# Patient Record
Sex: FEMALE | Race: OTHER
[De-identification: names, ages, dates, MRNs, and addresses within clinical notes are randomized per-mention and may not be internally consistent; named-entity substitution may affect disease eponyms.]

---
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
## 2020-05-06 ENCOUNTER — HOSPITAL ENCOUNTER (EMERGENCY)
Dept: HOSPITAL 62 - ER | Age: 20
Discharge: STILL ADMITTED OR ADMINISTRATIVE | End: 2020-05-06
Payer: MEDICAID

## 2020-05-06 ENCOUNTER — HOSPITAL ENCOUNTER (OUTPATIENT)
Dept: HOSPITAL 62 - LC | Age: 20
LOS: 1 days | Discharge: HOME | End: 2020-05-07
Attending: OBSTETRICS & GYNECOLOGY
Payer: MEDICAID

## 2020-05-06 VITALS — SYSTOLIC BLOOD PRESSURE: 124 MMHG | DIASTOLIC BLOOD PRESSURE: 72 MMHG

## 2020-05-06 DIAGNOSIS — O22.43: Primary | ICD-10-CM

## 2020-05-06 DIAGNOSIS — Z3A.34: ICD-10-CM

## 2020-05-06 DIAGNOSIS — R10.9: ICD-10-CM

## 2020-05-06 DIAGNOSIS — Z3A.35: ICD-10-CM

## 2020-05-06 DIAGNOSIS — O47.03: Primary | ICD-10-CM

## 2020-05-06 LAB
ADD MANUAL DIFF: NO
ALBUMIN SERPL-MCNC: 3.3 G/DL (ref 3.5–5)
ALP SERPL-CCNC: 198 U/L (ref 38–126)
ANION GAP SERPL CALC-SCNC: 9 MMOL/L (ref 5–19)
APPEARANCE UR: (no result)
APTT PPP: YELLOW S
AST SERPL-CCNC: 17 U/L (ref 14–36)
BACTERIA (WET MOUNT): (no result)
BASOPHILS # BLD AUTO: 0 10^3/UL (ref 0–0.2)
BASOPHILS NFR BLD AUTO: 0.4 % (ref 0–2)
BILIRUB DIRECT SERPL-MCNC: 0 MG/DL (ref 0–0.4)
BILIRUB SERPL-MCNC: 0.4 MG/DL (ref 0.2–1.3)
BILIRUB UR QL STRIP: NEGATIVE
BUN SERPL-MCNC: 6 MG/DL (ref 7–20)
CALCIUM: 8.6 MG/DL (ref 8.4–10.2)
CHLORIDE SERPL-SCNC: 103 MMOL/L (ref 98–107)
CO2 SERPL-SCNC: 20 MMOL/L (ref 22–30)
EOSINOPHIL # BLD AUTO: 0.1 10^3/UL (ref 0–0.6)
EOSINOPHIL NFR BLD AUTO: 1.7 % (ref 0–6)
EPITHELIALS (WET MOUNT): (no result)
ERYTHROCYTE [DISTWIDTH] IN BLOOD BY AUTOMATED COUNT: 15.2 % (ref 11.5–14)
GLUCOSE SERPL-MCNC: 86 MG/DL (ref 75–110)
GLUCOSE UR STRIP-MCNC: NEGATIVE MG/DL
HCT VFR BLD CALC: 32.1 % (ref 36–47)
HGB BLD-MCNC: 10.7 G/DL (ref 12–15.5)
KETONES UR STRIP-MCNC: (no result) MG/DL
LYMPHOCYTES # BLD AUTO: 1.8 10^3/UL (ref 0.5–4.7)
LYMPHOCYTES NFR BLD AUTO: 24.5 % (ref 13–45)
MCH RBC QN AUTO: 27.1 PG (ref 27–33.4)
MCHC RBC AUTO-ENTMCNC: 33.3 G/DL (ref 32–36)
MCV RBC AUTO: 81 FL (ref 80–97)
MONOCYTES # BLD AUTO: 0.6 10^3/UL (ref 0.1–1.4)
MONOCYTES NFR BLD AUTO: 7.5 % (ref 3–13)
NEUTROPHILS # BLD AUTO: 4.9 10^3/UL (ref 1.7–8.2)
NEUTS SEG NFR BLD AUTO: 65.9 % (ref 42–78)
NITRITE UR QL STRIP: NEGATIVE
PH UR STRIP: 6 [PH] (ref 5–9)
PLATELET # BLD: 237 10^3/UL (ref 150–450)
POTASSIUM SERPL-SCNC: 3.6 MMOL/L (ref 3.6–5)
PROT SERPL-MCNC: 6.5 G/DL (ref 6.3–8.2)
PROT UR STRIP-MCNC: 30 MG/DL
RBC # BLD AUTO: 3.95 10^6/UL (ref 3.72–5.28)
RBCS (WET MOUNT): (no result)
SP GR UR STRIP: 1.02
T.VAGINALIS (WET MOUNT): (no result)
TOTAL CELLS COUNTED % (AUTO): 100 %
UROBILINOGEN UR-MCNC: 2 MG/DL (ref ?–2)
WBC # BLD AUTO: 7.5 10^3/UL (ref 4–10.5)
WBCS (WET MOUNT): (no result)
YEAST (WET MOUNT): (no result)

## 2020-05-06 PROCEDURE — 83036 HEMOGLOBIN GLYCOSYLATED A1C: CPT

## 2020-05-06 PROCEDURE — 87086 URINE CULTURE/COLONY COUNT: CPT

## 2020-05-06 PROCEDURE — 87491 CHLMYD TRACH DNA AMP PROBE: CPT

## 2020-05-06 PROCEDURE — 87340 HEPATITIS B SURFACE AG IA: CPT

## 2020-05-06 PROCEDURE — 84702 CHORIONIC GONADOTROPIN TEST: CPT

## 2020-05-06 PROCEDURE — 84112 EVAL AMNIOTIC FLUID PROTEIN: CPT

## 2020-05-06 PROCEDURE — 86850 RBC ANTIBODY SCREEN: CPT

## 2020-05-06 PROCEDURE — 84443 ASSAY THYROID STIM HORMONE: CPT

## 2020-05-06 PROCEDURE — 59025 FETAL NON-STRESS TEST: CPT

## 2020-05-06 PROCEDURE — G0480 DRUG TEST DEF 1-7 CLASSES: HCPCS

## 2020-05-06 PROCEDURE — 86696 HERPES SIMPLEX TYPE 2 TEST: CPT

## 2020-05-06 PROCEDURE — 85025 COMPLETE CBC W/AUTO DIFF WBC: CPT

## 2020-05-06 PROCEDURE — 86701 HIV-1ANTIBODY: CPT

## 2020-05-06 PROCEDURE — 36415 COLL VENOUS BLD VENIPUNCTURE: CPT

## 2020-05-06 PROCEDURE — 86592 SYPHILIS TEST NON-TREP QUAL: CPT

## 2020-05-06 PROCEDURE — 86901 BLOOD TYPING SEROLOGIC RH(D): CPT

## 2020-05-06 PROCEDURE — 81001 URINALYSIS AUTO W/SCOPE: CPT

## 2020-05-06 PROCEDURE — 80053 COMPREHEN METABOLIC PANEL: CPT

## 2020-05-06 PROCEDURE — 80349 CANNABINOIDS NATURAL: CPT

## 2020-05-06 PROCEDURE — 86762 RUBELLA ANTIBODY: CPT

## 2020-05-06 PROCEDURE — 86695 HERPES SIMPLEX TYPE 1 TEST: CPT

## 2020-05-06 PROCEDURE — 87591 N.GONORRHOEAE DNA AMP PROB: CPT

## 2020-05-06 PROCEDURE — 99285 EMERGENCY DEPT VISIT HI MDM: CPT

## 2020-05-06 PROCEDURE — 87522 HEPATITIS C REVRS TRNSCRPJ: CPT

## 2020-05-06 PROCEDURE — 86900 BLOOD TYPING SEROLOGIC ABO: CPT

## 2020-05-06 PROCEDURE — 87081 CULTURE SCREEN ONLY: CPT

## 2020-05-06 PROCEDURE — 80307 DRUG TEST PRSMV CHEM ANLYZR: CPT

## 2020-05-06 PROCEDURE — 87210 SMEAR WET MOUNT SALINE/INK: CPT

## 2020-05-06 PROCEDURE — 76805 OB US >/= 14 WKS SNGL FETUS: CPT

## 2020-05-06 NOTE — RADIOLOGY REPORT (SQ)
EXAM DESCRIPTION:



[facility]: US PREGNANCY FOLLOW UP 



CLINICAL HISTORY: 

20 years  Female;  distended abd hemorrhoids; 

LMP 02/26/2020, 10 weeks 0 days



TECHNIQUE: 

Transabdominal obstetrical ultrasound was performed.  



COMPARISON: None.



FINDINGS:

Number of fetuses: Single

Fetal position: Vertex

BPD: 8.61 cm, 34 weeks 5 days

HC: 30.83 cm, 34 weeks 3 days

AC: 31.31 cm, 35 weeks 2 days

FL: 6.8 cm, 35 weeks 0 days

EFW: 2572 g

HR: 139 BPM

Anatomy: Limited evaluation on this survey exam

Amniotic fluid: WHITNEY 5.5 cm, decreased. LBP 2.4 x 3.4 cm

Cervix: Could not be visualized

Placenta: Posterior



IMPRESSION:



1.  Single viable IUP

2.  EGA 34 weeks 6 days, EDC 06/11/2020 (discordant with stated

LMP)

3.  Oligohydramnios

## 2020-05-06 NOTE — ER DOCUMENT REPORT
ED General





- General


Chief Complaint: Hemorrhoids


Stated Complaint: REPORTS HEMORRHOIDS


Time Seen by Provider: 05/06/20 20:49


Primary Care Provider: 


ERIN ZAVALETA MD [Primary Care Provider] - Follow up as needed


Mode of Arrival: Ambulatory


Information source: Patient


Notes: 





20-year-old  American female arrives with chief complaint of having 

swollen belly from pregnancy with some hemorrhoids.  Patient was inspected with 

nursing staff around 2100 with internal hemorrhoids and fundus and epigastric 

area.  She has had no prenatal care and has been feeling kicking.  She reports 

she has had on and off periods irregular for the last several months and her 

last menstrual flow was in March 2020


Patient reports she is G2, P1 with a 1-year-old daughter at home.


TRAVEL OUTSIDE OF THE [place] IN LAST 30 DAYS: No





- Related Data


Allergies/Adverse Reactions: 


                                        





No Known Allergies Allergy (Verified 05/06/20 20:43)


   











Past Medical History





- General


Information source: Patient





- Social History


Smoking Status: Never Smoker


Cigarette use (# per day): No


Chew tobacco use (# tins/day): No


Smoking Education Provided: No


Frequency of alcohol use: None


Drug Abuse: None


Lives with: Family


Family History: Reviewed & Not Pertinent


Patient has homicidal ideation: No


Renal/ Medical History: Denies: Hx Peritoneal Dialysis





Review of Systems





- Review of Systems


Constitutional: No symptoms reported


EENT: No symptoms reported


Cardiovascular: No symptoms reported


Respiratory: No symptoms reported


Gastrointestinal: See HPI, Abdomen distended, Abdominal pain, Other - Hemorrhoid

internal externally displaced on Valsalva


Genitourinary: No symptoms reported


Female Genitourinary: No symptoms reported


Musculoskeletal: No symptoms reported


Skin: No symptoms reported


Hematologic/Lymphatic: No symptoms reported


Neurological/Psychological: No symptoms reported





Physical Exam





- Vital signs


Vitals: 


                                        











Temp


 


 98.7 F 


 


 05/06/20 20:31











Interpretation: Normal





- HEENT


Head: Normocephalic, Atraumatic


Eyes: Normal


Pupils: PERRL


Pharynx: Normal


Neck: Normal





- Respiratory


Respiratory status: No respiratory distress


Chest status: Nontender


Breath sounds: Normal


Chest palpation: Normal





- Cardiovascular


Rhythm: Regular


Heart sounds: Normal auscultation


Murmur: No





- Abdominal


Inspection: Gravid female


Distension: Distended


Bowel sounds: Normal


Tenderness: Nontender


Organomegaly: No organomegaly





- Rectal


Tenderness: Yes


Hemorrhoids: Internal





- Genitourinary


External exam: Normal





- Back


Back: Normal





- Extremities


General upper extremity: Normal inspection


General lower extremity: Normal inspection





- Neurological


Neuro grossly intact: Yes


Cognition: Normal


Orientation: AAOx4


[place] Coma Scale Eye Opening: Spontaneous


[place] Coma Scale Verbal: Oriented


[place] Coma Scale Motor: Obeys Commands


[place] Coma Scale Total: 15


Speech: Normal


Motor strength normal: LUE, RUE, LLE, RLE


Sensory: Normal





- Psychological


Associated symptoms: Normal affect





- Skin


Skin Temperature: Warm


Skin Moisture: Dry





Course





- Vital Signs


Vital signs: 


                                        











Temp Pulse Resp BP Pulse Ox


 


 98.7 F             


 


 05/06/20 20:31            














- Laboratory


Result Diagrams: 


                                 05/06/20 21:10





                                 05/06/20 21:10


Laboratory results interpreted by me: 


                                        











  05/06/20 05/06/20





  21:10 21:10


 


Hgb  10.7 L 


 


Hct  32.1 L 


 


RDW  15.2 H 


 


Sodium   132.0 L


 


Carbon Dioxide   20 L


 


BUN   6 L


 


Creatinine   0.35 L


 


Alkaline Phosphatase   198 H


 


Albumin   3.3 L


 


Beta HCG, Quant   6786.40 H














- Diagnostic Test


Radiology reviewed: Reports reviewed


Radiology results interpreted by me: 





05/06/20 22:53


pt at 35 weeks 5lb 11 oz





Critical Care Note





- Critical Care Note


Total time excluding time spent on procedures (mins): 90


Comments: 





I discussed this case with Dr. Rios and she advises sending the patient 

upstairs to L&D; I discussed this with patient and she advises she is agreeable 

to this.





Discharge





- Discharge


Clinical Impression: 


Pregnancy


Qualifiers:


 Weeks of gestation: 35 weeks Qualified Code(s): Z3A.35 - 35 weeks gestation of 

pregnancy





Condition: Good


Disposition: LABOR CHECK


Admitting Provider: yusuf


Unit Admitted: Labor and Delivery


Additional Instructions: 


Send patient upstairs to L&D


Referrals: 


ERIN ZAVALETA MD [Primary Care Provider] - Follow up as needed

## 2020-05-07 LAB
BARBITURATES UR QL SCN: NEGATIVE
CHLAM PCR: NOT DETECTED
METHADONE UR QL SCN: NEGATIVE
PCP UR QL SCN: NEGATIVE
URINE AMPHETAMINES SCREEN: NEGATIVE
URINE BENZODIAZEPINES SCREEN: NEGATIVE
URINE COCAINE SCREEN: NEGATIVE
URINE MARIJUANA (THC) SCREEN: (no result)

## 2020-05-07 NOTE — NON STRESS TEST REPORT
=================================================================

Non Stress Test

=================================================================

Datetime Report Generated by CPN: 05/07/2020 02:23

   

   

=================================================================

DEMOGRAPHIC

=================================================================

   

EGA NST:  34.6

   

=================================================================

INDICATION

=================================================================

   

Indication for Study (NST) Other:  labor check

   

=================================================================

MONITORING

=================================================================

   

Monitor Explained:  Monitor Explained; Test Explained; Patient

   Verbalized Understanding

Time on Monitor:  05/06/2020 23:26

Time off Monitor:  05/07/2020 01:00

NST Duration:  94

   

=================================================================

NST INTERVENTIONS

=================================================================

   

NST Interventions:  PO Hydration

Physician Notified NST:  Dr. Rios

BABY A:  [id number]

   

=================================================================

BABY A

=================================================================

   

Fetal Movement :  Present

Fetal Movement :  Present

Contraction Frequency :  irregular

FHR Baseline :  130

Accelerations :  15X15

Decelerations :  None

Variability :  Moderate 6-25bpm

NST Review:  Meets Criteria for Reactive NST

NST Review and Verified By :  TAVARES Fletcher Results:  Reactive

   

=================================================================

NST REPORT

=================================================================

   

Report Trigger:  Send Report

## 2020-05-08 ENCOUNTER — HOSPITAL ENCOUNTER (INPATIENT)
Dept: HOSPITAL 62 - LC | Age: 20
LOS: 2 days | Discharge: HOME | End: 2020-05-10
Attending: OBSTETRICS & GYNECOLOGY | Admitting: OBSTETRICS & GYNECOLOGY
Payer: MEDICAID

## 2020-05-08 DIAGNOSIS — Z3A.35: ICD-10-CM

## 2020-05-08 DIAGNOSIS — Z23: ICD-10-CM

## 2020-05-08 LAB
ADD MANUAL DIFF: NO
APPEARANCE UR: CLEAR
APTT PPP: YELLOW S
BARBITURATES UR QL SCN: NEGATIVE
BASOPHILS # BLD AUTO: 0 10^3/UL (ref 0–0.2)
BASOPHILS NFR BLD AUTO: 0.4 % (ref 0–2)
BILIRUB UR QL STRIP: NEGATIVE
EOSINOPHIL # BLD AUTO: 0 10^3/UL (ref 0–0.6)
EOSINOPHIL NFR BLD AUTO: 0.5 % (ref 0–6)
ERYTHROCYTE [DISTWIDTH] IN BLOOD BY AUTOMATED COUNT: 15.6 % (ref 11.5–14)
GLUCOSE UR STRIP-MCNC: NEGATIVE MG/DL
HCT VFR BLD CALC: 33.7 % (ref 36–47)
HGB BLD-MCNC: 11.4 G/DL (ref 12–15.5)
HSV1 IGG SER-ACNC: <0.91 INDEX (ref 0–0.9)
KETONES UR STRIP-MCNC: 80 MG/DL
LYMPHOCYTES # BLD AUTO: 1.3 10^3/UL (ref 0.5–4.7)
LYMPHOCYTES NFR BLD AUTO: 17.3 % (ref 13–45)
MCH RBC QN AUTO: 27.6 PG (ref 27–33.4)
MCHC RBC AUTO-ENTMCNC: 34 G/DL (ref 32–36)
MCV RBC AUTO: 81 FL (ref 80–97)
METHADONE UR QL SCN: NEGATIVE
MONOCYTES # BLD AUTO: 0.6 10^3/UL (ref 0.1–1.4)
MONOCYTES NFR BLD AUTO: 7.1 % (ref 3–13)
NEUTROPHILS # BLD AUTO: 5.8 10^3/UL (ref 1.7–8.2)
NEUTS SEG NFR BLD AUTO: 74.7 % (ref 42–78)
NITRITE UR QL STRIP: NEGATIVE
PCP UR QL SCN: NEGATIVE
PH UR STRIP: 7 [PH] (ref 5–9)
PLATELET # BLD: 252 10^3/UL (ref 150–450)
PROT UR STRIP-MCNC: NEGATIVE MG/DL
RBC # BLD AUTO: 4.14 10^6/UL (ref 3.72–5.28)
SP GR UR STRIP: 1.02
TOTAL CELLS COUNTED % (AUTO): 100 %
URINE AMPHETAMINES SCREEN: NEGATIVE
URINE BENZODIAZEPINES SCREEN: NEGATIVE
URINE COCAINE SCREEN: NEGATIVE
URINE MARIJUANA (THC) SCREEN: (no result)
UROBILINOGEN UR-MCNC: 4 MG/DL (ref ?–2)
WBC # BLD AUTO: 7.7 10^3/UL (ref 4–10.5)

## 2020-05-08 PROCEDURE — 94760 N-INVAS EAR/PLS OXIMETRY 1: CPT

## 2020-05-08 PROCEDURE — 86592 SYPHILIS TEST NON-TREP QUAL: CPT

## 2020-05-08 PROCEDURE — 90715 TDAP VACCINE 7 YRS/> IM: CPT

## 2020-05-08 PROCEDURE — 86850 RBC ANTIBODY SCREEN: CPT

## 2020-05-08 PROCEDURE — 86900 BLOOD TYPING SEROLOGIC ABO: CPT

## 2020-05-08 PROCEDURE — 84112 EVAL AMNIOTIC FLUID PROTEIN: CPT

## 2020-05-08 PROCEDURE — 36415 COLL VENOUS BLD VENIPUNCTURE: CPT

## 2020-05-08 PROCEDURE — 86901 BLOOD TYPING SEROLOGIC RH(D): CPT

## 2020-05-08 PROCEDURE — 81001 URINALYSIS AUTO W/SCOPE: CPT

## 2020-05-08 PROCEDURE — 85025 COMPLETE CBC W/AUTO DIFF WBC: CPT

## 2020-05-08 PROCEDURE — 88307 TISSUE EXAM BY PATHOLOGIST: CPT

## 2020-05-08 PROCEDURE — 80307 DRUG TEST PRSMV CHEM ANLYZR: CPT

## 2020-05-08 PROCEDURE — 0HQ9XZZ REPAIR PERINEUM SKIN, EXTERNAL APPROACH: ICD-10-PCS | Performed by: OBSTETRICS & GYNECOLOGY

## 2020-05-08 PROCEDURE — 85027 COMPLETE CBC AUTOMATED: CPT

## 2020-05-08 PROCEDURE — 3E0234Z INTRODUCTION OF SERUM, TOXOID AND VACCINE INTO MUSCLE, PERCUTANEOUS APPROACH: ICD-10-PCS | Performed by: OBSTETRICS & GYNECOLOGY

## 2020-05-08 RX ADMIN — PENICILLIN G POTASSIUM SCH MLS/HR: 5000000 POWDER, FOR SOLUTION INTRAMUSCULAR; INTRAPLEURAL; INTRATHECAL; INTRAVENOUS at 20:47

## 2020-05-08 NOTE — ADMISSION PHYSICAL
=================================================================



=================================================================

Datetime Report Generated by CPN: 2020 20:30

   

   

=================================================================

CURRENT ADMISSION

=================================================================

   

Chief Complaint:  Uterine Contractions; Suspected Ruptured Membranes

Admit Impression :  , Intrauterine Pregnancy; Active Labor;

   Ruptured Membranes

Admit Plan:  Admit to Unit; Initiate  Labor Protocol

   

=================================================================

ALLERGIES

=================================================================

   

Medication Allergies:  No

Medication Allergies:  No Known Allergies (2020)

Latex:  No Latex Allergies

   

=================================================================

OBSTETRICAL HISTORY

=================================================================

   

EDC:  2020 00:00

:  2

Para:  1

Term:  1

:  0

SAB:  0

IAB:  0

Ectopic:  0

Livin

Cesareans:  0

VBACs:  0

Multiple Births:  0

Gestational Diabetes:  No

Rh Sensitization:  No

Incompetent Cervix:  No

TASNEEM:  No

Infertility:  No

ART Treatment:  No

Uterine Anomaly:  No

IUGR:  No

Hx Previous C/S:  No

Macrosomia:  No

Hx Loss/Stillborn:  No

PIH:  No

Hx  Death:  No

Placenta Previa/Abruption:  No

Depression/PP Depression:  No

PTL/PROM:  No

Post Partum Hemorrhage:  No

Current Pregnancy Procedures:  Ultrasound

Obstetrical History Comments:  G1-, Boy-Term 2018, 7# 11oz

   G2-Current

   

=================================================================

***SEE PRENATAL RECORDS***

=================================================================

   

Alcohol:  Yes

Alcohol Frequency:  Occasional

Advised to Stop:  No

Alcohol Comments:  Pt drank occasionally throughout the pregnancy.

Marijuana :  No

Marijuana Frequency:  Occasional

Last Used:  2020 00:00

Previous Treatment:  None

Cocaine:  No

Other Illicit Drugs:  No

Cigarettes:  Never Smoker. [id number]

   

=================================================================

MEDICAL HISTORY

=================================================================

   

Diabetes:  No

Blood Transfusion:  No

Pulmonary Disease (Asthma, TB):  No

Breast Disease:  No

Hypertension:  No

Gyn Surgery:  No

Heart Disease:  No

Hosp/Surgery:  Yes

Autoimmune Disorder:  No

Anesthetic Complications:  No

Kidney Disease:  No

Abnormal Pap Smear:  No

Neuro/Epilepsy:  No

Psychiatric Disorders:  No

Other Medical Diseases:  No

Hepatitis/Liver Disease:  No

Significant Family History:  No

Varicosities/Phlebitis:  No

Trauma/Violence :  No

Thyroid Dysfunction:  No

Medical History Comments:  Childbirth 

   

=================================================================

INFECTIOUS HISTORY

=================================================================

   

Gonorrhea:  No

Genital Herpes:  No

Chlamydia:  Yes

Tuberculosis:  No

Syphilis:  No

Hepatitis:  No

HIV/AIDS Exposure:  No

Rash or Viral Illness:  No

HPV:  No

Infectious History Comments:  Chlamydia-

   

=================================================================

PHYSICAL EXAM

=================================================================

   

General:  Normal

HEENT:  Normal

Neurologic:  Normal

Thyroid:  Normal

Heart:  Normal

Lungs:  Normal

Breast:  Normal

Back:  Normal

Abdomen:  Normal

Genitourinary Exam:  Normal

Extremities:  Normal

DTRs:  Normal

Pelvic Type:  Adequate

Vital Signs:  Reviewed; Within Normal Limits

   

=================================================================

VAGINAL EXAM

=================================================================

   

Dilatation:  2

Effacement:  50

Station:  -2

Contraction Comments:  Regular 

   

=================================================================

MEMBRANES

=================================================================

   

Pooling:  Positive

Membranes:  Ruptured

Amniotic Fluid Color:  Meconium, Heavy

   

=================================================================

FETUS A

=================================================================

   

EGA:  35.1

Monitoring:  External US

FHR- Baseline:  145

Variability:  Moderate 6-25bpm

Accelerations:  10X10

Decelerations:  None

FHR Category:  Category I

Fetal Presentation:  Vertex

Admit Comment:   at 35.1 wks EGA with complaints of ruptured

   membranes at around 1500 today. She states fluid looked green. No

   Vaginal bleeding. Good fetal movement. 

   _Admit to LDR

   -Positive SROM by actiprom and grossly mec stained fluid

   -GBS pending, begin PCN IV for GBS prophylaxis

   -NPO and IVFs

   -Desires Epidural

   -Hx of one prior , anticipate 

   

=================================================================

PLANS FOR LABOR AND DELIVERY

=================================================================

   

Labor and Delivery:  None

Pain Management:  Epidural

Feeding Preference:  Breast

Benefit of Breast Feed Discussed:  Yes

Circumcision:  N/A

   

=================================================================

INFORMED CONSENT

=================================================================

   

Informed Consent Obtained:  Vaginal Delivery; Risks, Benefits and

   Alternatives Discussed

Signature:  Electronically signed by Cindy Yarbrough MD on 2020 at

   20:29  with User ID: [email or web address]

:  Electronically signed by Cindy Yarbrough MD on 2020 at 20:29  with

   User ID: [email or web address]

## 2020-05-09 LAB
ERYTHROCYTE [DISTWIDTH] IN BLOOD BY AUTOMATED COUNT: 15.2 % (ref 11.5–14)
HCT VFR BLD CALC: 29.9 % (ref 36–47)
HGB BLD-MCNC: 10.3 G/DL (ref 12–15.5)
MCH RBC QN AUTO: 27.9 PG (ref 27–33.4)
MCHC RBC AUTO-ENTMCNC: 34.5 G/DL (ref 32–36)
MCV RBC AUTO: 81 FL (ref 80–97)
PLATELET # BLD: 251 10^3/UL (ref 150–450)
RBC # BLD AUTO: 3.69 10^6/UL (ref 3.72–5.28)
WBC # BLD AUTO: 8.1 10^3/UL (ref 4–10.5)

## 2020-05-09 RX ADMIN — IBUPROFEN SCH MG: 800 TABLET, FILM COATED ORAL at 21:49

## 2020-05-09 RX ADMIN — DOCUSATE SODIUM SCH MG: 100 CAPSULE, LIQUID FILLED ORAL at 18:29

## 2020-05-09 RX ADMIN — SENNOSIDES, DOCUSATE SODIUM SCH EACH: 50; 8.6 TABLET, FILM COATED ORAL at 09:32

## 2020-05-09 RX ADMIN — Medication SCH CAP: at 09:31

## 2020-05-09 RX ADMIN — FAMOTIDINE SCH: 20 TABLET, FILM COATED ORAL at 21:50

## 2020-05-09 RX ADMIN — FERROUS SULFATE TAB 325 MG (65 MG ELEMENTAL FE) SCH MG: 325 (65 FE) TAB at 18:29

## 2020-05-09 RX ADMIN — DOCUSATE SODIUM SCH MG: 100 CAPSULE, LIQUID FILLED ORAL at 09:32

## 2020-05-09 RX ADMIN — PENICILLIN G POTASSIUM SCH: 5000000 POWDER, FOR SOLUTION INTRAMUSCULAR; INTRAPLEURAL; INTRATHECAL; INTRAVENOUS at 01:25

## 2020-05-09 RX ADMIN — FERROUS SULFATE TAB 325 MG (65 MG ELEMENTAL FE) SCH MG: 325 (65 FE) TAB at 09:31

## 2020-05-09 RX ADMIN — IBUPROFEN SCH MG: 800 TABLET, FILM COATED ORAL at 13:42

## 2020-05-09 RX ADMIN — BENZOCAINE AND MENTHOL PRN SPR: 20; .5 SPRAY TOPICAL at 01:48

## 2020-05-09 RX ADMIN — IBUPROFEN SCH MG: 800 TABLET, FILM COATED ORAL at 05:27

## 2020-05-09 RX ADMIN — FAMOTIDINE SCH MG: 20 TABLET, FILM COATED ORAL at 09:32

## 2020-05-09 NOTE — DELIVERY SUMMARY
=================================================================

Del Sum A-C

=================================================================

Datetime Report Generated by CPN: 2020 01:05

   

   

=================================================================

DELIVERY PERSONNEL

=================================================================

   

DELIVERY PERSONNEL:  [id number]

Delivery Doctor::  Cindy Yarbrough MD

Labor and Delivery Nurse::  Fang Estrada RN

Labor and Delivery Nurse::  Virginia Montes RN

Nursery Nurse::  Bebe Wagner RN

Nursery Nurse::  Ashlyn Parker RN

Scrub Tech/CNA:  ST Basil

Scrub Tech/CNA:  Yumi Saldana, CST

Additional Personnel: :  Bebe Pelaez RN

   

=================================================================

MATERNAL INFORMATION

=================================================================

   

Delivery Anesthesia:  Epidural

Medications After Delivery:  Pitocin Drip 20 Units/1000ml NSS

Estimated  Blood Loss (ml):  300

Maternal Complications:  Precipitous Labor (<3hrs)

Provider Comments:  Called to patients room as she was +3 with urge to

   push. Pushed through 2 contractions and delivered a viable female

   infant with Apgars of 8,9 at one and 5 minutes repectfully

   

=================================================================

LABOR SUMMARY

=================================================================

   

EDC:  2020 00:00

No. Babies in Womb:  1

 Attempted:  No

Labor Anesthesia:  None

   

=================================================================

LABOR INFORMATION

=================================================================

   

Reason for Induction:  Not Applicable

Onset of Labor:  2020 20:13

Complete Dilatation:  2020 22:31

Oxytocin:  N/A

Group B Beta Strep:  unknown

Antibiotics # of Doses:  2

Antibiotics Time of Last Dose:  

Name of Antibiotic Given:  PCN

Steroids Given:  None

Reason Steroids Not Administered:  Not Applicable

   

=================================================================

MEMBRANES

=================================================================

   

Membranes Rupture Method:  Spontaneous

Rupture of Membranes:  2020 16:00

Length of Rupture (hr):  6.60

Amniotic Fluid Color:  Heavy Meconium

Amniotic Fluid Amount:  Moderate

   

=================================================================

STAGES OF LABOR

=================================================================

   

Stage 1 hr:  2

Stage 1 min:  18

Stage 2 hr:  0

Stage 2 min:  5

Stage 3 hr:  24

Stage 3 min:  4

Total Time in Labor hr:  26

Total Time in Labor min:  27

   

=================================================================

VAGINAL DELIVERY

=================================================================

   

Episiotomy:  None

Laceration #1:  Perineal

Laceration Extension #1:  First Degree

Laceration Repair:  Yes

Laceration Repair Note:  Repaired with 3-0 chromic . 

Sponge Count Correct:  Yes

Sharps Count Correct:  Yes

   

=================================================================

CSECTION DELIVERY

=================================================================

   

Primary Indication:  N/A

Secondary Indication:  N/A

CSection Incidence:  N/A

Labor:  N/A

Elective:  N/A

   

=================================================================

BABY A INFORMATION

=================================================================

   

Infant Delivery Date/Time:  2020 22:36

Method of Delivery:  Vaginal

Born in Route :  No

:  N/A

Forceps:  N/A

Vacuum Extraction:  N/A

Shoulder Dystocia :  No

   

=================================================================

PRESENTATION/POSITION BABY A

=================================================================

   

Presentation:  Cephalic

Cephalic Presentation:  Vertex

Vertex Position:  Left Occipital Anterior

Breech Presentation:  N/A

   

=================================================================

PLACENTA INFORMATION BABY A

=================================================================

   

Placenta Delivery Time :  2020 22:40

Placenta Method of Delivery:  Spontaneous

Placenta Status:  Delivered

   

=================================================================

APGAR SCORES BABY A

=================================================================

   

Heart Rate 1 min:  >100 bpm

Resp Effort 1 min:  Good Cry

Reflex Irritability 1 min:  Cough or Sneeze or Pulls Away

Muscle Tone 1 min:  Active Motion

Color 1 min:  Blue/Pale

Resuscitation Effort 1 min:  Tactile Stimulation

APGAR SCORE 1 MIN:  8

Heart Rate 5 min:  >100 bpm

Resp Effort 5 min:  Good Cry

Reflex Irritability 5 min:  Cough or Sneeze or Pulls Away

Muscle Tone 5 min:  Active Motion

Color 5 min:  Body Pink, Extremities Blue

Resuscitation Effort 5 min:  Tactile Stimulation

APGAR SCORE 5 MIN:  9

   

=================================================================

INFANT INFORMATION BABY A

=================================================================

   

Gestational Age at Delivery:  35.1

Gestational Status:  Late - 34- 36.6 Weeks

Infant Outcome :  Liveborn

Infant Condition :  Stable

Infant Sex:  Female

   

=================================================================

IDENTIFICATION BABY A

=================================================================

   

Infant Verification Date/Time:  2020 23:54

ID Band Number:  [id number]

Mother's Name Verified:  Yes

Infant Medical Record Number:  [id number]

RN Verifying Infant:  B. Ring _ H. Wagner

   

=================================================================

WEIGHT/LENGTH BABY A

=================================================================

   

Infant Birthweight (gm):  2720

Infant Weight (lb):  6

Infant Weight (oz):  0

Infant Length (in):  19.29

Infant Length (cm):  49.00

   

=================================================================

CORD INFORMATION BABY A

=================================================================

   

No. Cord Vessels:  3

Nuchal Cord :  Around Neck x1, Loose

Cord Blood Taken:  Yes-For Eval (Mom's Blood Type - or O+)

Infant Suction:  Mouth; Nose

   

=================================================================

ASSESSMENT BABY A

=================================================================

   

Infant Complications:  Meconium

Physical Findings at Delivery:  Within Normal Limits

Physical Findings- Other:  See full nursery RN assessment

Infant Respirations:  Appears Normal

Skin to Skin:  Yes

Skin to Skin Time (min):  60

Neonatologist/ALS Called :  No

Infant Care By:  SO Wagner RN, SO ePlaez RN

Transferred To:  Remains with Mother

   

=================================================================

BABY B INFORMATION

=================================================================

   

 :  N/A

   

=================================================================

SIGNATURES

=================================================================

   

Signature:  Electronically signed by Cindy Yarbrough MD on 2020 at

   23:13  with User ID: [email or web address]

:  Electronically signed by Cindy Yarbrough MD on 2020 at 23:13  with

   User ID: [email or web address]

## 2020-05-09 NOTE — WARNING SIGNS IN BABIES
=================================================================

VOD Warning Signs

=================================================================

Datetime Report Generated by [facility]: 05/09/2020 01:04

   

VOD#608 -Warning Signs in Babies:  Needs to be viewed.    (05/06/2020

   23:20:Fang Estrada RN)

## 2020-05-09 NOTE — PDOC PROGRESS REPORT
Subjective-OB


Progress Note for:: 05/09/20


Subjective: 





reports bleeding slowing, pain controlled with current meds, denies needs.





Physical Exam (OB)


Vital Signs: 


                                        











Temp Pulse Resp BP Pulse Ox


 


 98.1 F   70   16   112/62   100 


 


 05/09/20 07:41  05/09/20 07:41  05/09/20 07:41  05/09/20 07:41  05/09/20 07:41








                                 Intake & Output











 05/08/20 05/09/20 05/10/20





 06:59 06:59 06:59


 


Intake Total   600


 


Balance   600


 


Weight  84.09 kg 














- Abdomen


Description: Soft


Hernia Present: No


Fundal Description: Firm, Midline


Fundal Height: u/u - u/2





- Abdominal


Distension: No distension


Tenderness: Nontender





- Extremities


Calf: Normal, Nontender





Objective-Diagnostic


Laboratory: 


                                        





                                 05/09/20 06:48 





                                        











  05/08/20 05/08/20 05/08/20





  17:19 17:19 17:53


 


WBC  7.7  


 


RBC  4.14  


 


Hgb  11.4 L  


 


Hct  33.7 L  


 


MCV  81  


 


MCH  27.6  


 


MCHC  34.0  


 


RDW  15.6 H  


 


Plt Count  252  


 


Seg Neutrophils %  74.7  


 


Urine Color    YELLOW


 


Urine Appearance    CLEAR


 


Urine pH    7.0


 


Ur Specific Gravity    1.025


 


Urine Protein    NEGATIVE


 


Urine Glucose (UA)    NEGATIVE


 


Urine Ketones    80 H


 


Urine Blood    SMALL H


 


Urine Nitrite    NEGATIVE


 


Ur Leukocyte Esterase    NEGATIVE


 


Urine WBC (Auto)    3


 


Urine RBC (Auto)    1


 


Blood Type   O POSITIVE 


 


Antibody Screen   NEGATIVE 














  05/09/20





  06:48


 


WBC  8.1


 


RBC  3.69 L


 


Hgb  10.3 L


 


Hct  29.9 L


 


MCV  81


 


MCH  27.9


 


MCHC  34.5


 


RDW  15.2 H


 


Plt Count  251


 


Seg Neutrophils % 


 


Urine Color 


 


Urine Appearance 


 


Urine pH 


 


Ur Specific Gravity 


 


Urine Protein 


 


Urine Glucose (UA) 


 


Urine Ketones 


 


Urine Blood 


 


Urine Nitrite 


 


Ur Leukocyte Esterase 


 


Urine WBC (Auto) 


 


Urine RBC (Auto) 


 


Blood Type 


 


Antibody Screen

## 2020-05-10 VITALS — SYSTOLIC BLOOD PRESSURE: 107 MMHG | DIASTOLIC BLOOD PRESSURE: 58 MMHG

## 2020-05-10 RX ADMIN — SENNOSIDES, DOCUSATE SODIUM SCH EACH: 50; 8.6 TABLET, FILM COATED ORAL at 10:09

## 2020-05-10 RX ADMIN — DOCUSATE SODIUM SCH MG: 100 CAPSULE, LIQUID FILLED ORAL at 10:09

## 2020-05-10 RX ADMIN — Medication SCH CAP: at 10:09

## 2020-05-10 RX ADMIN — IBUPROFEN SCH: 800 TABLET, FILM COATED ORAL at 07:40

## 2020-05-10 RX ADMIN — FERROUS SULFATE TAB 325 MG (65 MG ELEMENTAL FE) SCH MG: 325 (65 FE) TAB at 10:09

## 2020-05-10 RX ADMIN — BENZOCAINE AND MENTHOL PRN SPR: 20; .5 SPRAY TOPICAL at 01:00

## 2020-05-10 RX ADMIN — FAMOTIDINE SCH MG: 20 TABLET, FILM COATED ORAL at 10:09

## 2020-05-10 NOTE — PDOC DISCHARGE SUMMARY
Impression





- Admit/DC Date/PCP


Admission Date/Primary Care Provider: 


  20 17:15





  LEIGH SOTOMAYOR MD





Discharge Date: 05/10/20





- Discharge Diagnosis


(1) Meconium in amniotic fluid


Is this a current diagnosis for this admission?: Yes   





(2) Obstetrical laceration


Is this a current diagnosis for this admission?: Yes   





(3)  delivery


Is this a current diagnosis for this admission?: Yes   








(5) GBS (group B Streptococcus carrier), +RV culture, currently pregnant


Is this a current diagnosis for this admission?: Yes   





(6) Normal vaginal delivery


Is this a current diagnosis for this admission?: Yes   





- Additional Information


Discharge Diet: Regular


Discharge Activity: Balance Activity w/Rest, Pelvic Rest


Referrals: 


LEIGH SOTOMAYOR MD [Primary Care Provider] - 


Prescriptions: 


Ibuprofen [Motrin 800 mg Tablet] 800 mg PO Q8HP PRN #60 tablet


 PRN Reason: 


Home Medications: 








Prenat 115/Iron Fum/Folic/Dss [Prenatal 19 Tablet] 1 tab PO DAILY 20 


Ibuprofen [Motrin 800 mg Tablet] 800 mg PO Q8HP PRN #60 tablet 05/10/20 











Results


Laboratory Results: 


                                        











WBC  8.1 10^3/uL (4.0-10.5)   20  06:48    


 


RBC  3.69 10^6/uL (3.72-5.28)  L  20  06:48    


 


Hgb  10.3 g/dL (12.0-15.5)  L  20  06:48    


 


Hct  29.9 % (36.0-47.0)  L  20  06:48    


 


MCV  81 fl (80-97)   20  06:48    


 


MCH  27.9 pg (27.0-33.4)   20  06:48    


 


MCHC  34.5 g/dL (32.0-36.0)   20  06:48    


 


RDW  15.2 % (11.5-14.0)  H  20  06:48    


 


Plt Count  251 10^3/uL (150-450)   20  06:48    


 


Lymph % (Auto)  17.3 % (13-45)   20  17:19    


 


Mono % (Auto)  7.1 % (3-13)   20  17:19    


 


Eos % (Auto)  0.5 % (0-6)   20  17:19    


 


Baso % (Auto)  0.4 % (0-2)   20  17:19    


 


Absolute Neuts (auto)  5.8 10^3/uL (1.7-8.2)   20  17:19    


 


Absolute Lymphs (auto)  1.3 10^3/uL (0.5-4.7)   20  17:19    


 


Absolute Monos (auto)  0.6 10^3/uL (0.1-1.4)   20  17:19    


 


Absolute Eos (auto)  0.0 10^3/uL (0.0-0.6)   20  17:19    


 


Absolute Basos (auto)  0.0 10^3/uL (0.0-0.2)   20  17:19    


 


Seg Neutrophils %  74.7 % (42-78)   20  17:19    


 


Urine Color  YELLOW   20  17:53    


 


Urine Appearance  CLEAR   20  17:53    


 


Urine pH  7.0  (5.0-9.0)   20  17:53    


 


Ur Specific Gravity  1.025   20  17:53    


 


Urine Protein  NEGATIVE mg/dL (NEGATIVE)   20  17:53    


 


Urine Glucose (UA)  NEGATIVE mg/dL (NEGATIVE)   20  17:53    


 


Urine Ketones  80 mg/dL (NEGATIVE)  H  20  17:53    


 


Urine Blood  SMALL  (NEGATIVE)  H  20  17:53    


 


Urine Nitrite  NEGATIVE  (NEGATIVE)   20  17:53    


 


Urine Bilirubin  NEGATIVE  (NEGATIVE)   20  17:53    


 


Urine Urobilinogen  4.0 mg/dL (<2.0)  H  20  17:53    


 


Ur Leukocyte Esterase  NEGATIVE  (NEGATIVE)   20  17:53    


 


Urine WBC (Auto)  3 /HPF  20  17:53    


 


Urine RBC (Auto)  1 /HPF  20  17:53    


 


U Hyaline Cast (Auto)  1 /LPF  20  17:53    


 


Squamous Epi Cells Auto  1 /HPF  20  17:53    


 


Urine Mucus (Auto)  FEW /LPF  20  17:53    


 


Urine Ascorbic Acid  NEGATIVE  (NEGATIVE)   20  17:53    


 


Fetal Membranes Rupture  POSITIVE  (NEGATIVE)  H  20  16:38    


 


Urine Opiates Screen  NEGATIVE   20  17:53    


 


Urine Methadone Screen  NEGATIVE   20  17:53    


 


Ur Barbiturates Screen  NEGATIVE   20  17:53    


 


Ur Phencyclidine Scrn  NEGATIVE   20  17:53    


 


Ur Amphetamines Screen  NEGATIVE   20  17:53    


 


U Benzodiazepines Scrn  NEGATIVE   20  17:53    


 


Urine Cocaine Screen  NEGATIVE   20  17:53    


 


U Marijuana (THC) Screen  UNCONFIRMED POSITIVE   20  17:53    


 


Blood Type  O POSITIVE   20  17:19    


 


Antibody Screen  NEGATIVE   20  17:19    














Plan


Plan of Treatment: 


follow up in 4 weeks at [facility] for post partum check

## 2021-01-05 ENCOUNTER — HOSPITAL ENCOUNTER (EMERGENCY)
Dept: HOSPITAL 62 - ER | Age: 21
Discharge: HOME | End: 2021-01-05
Payer: MEDICAID

## 2021-01-05 VITALS — DIASTOLIC BLOOD PRESSURE: 61 MMHG | SYSTOLIC BLOOD PRESSURE: 118 MMHG

## 2021-01-05 DIAGNOSIS — R68.83: ICD-10-CM

## 2021-01-05 DIAGNOSIS — R53.83: ICD-10-CM

## 2021-01-05 DIAGNOSIS — R10.30: ICD-10-CM

## 2021-01-05 DIAGNOSIS — R11.2: Primary | ICD-10-CM

## 2021-01-05 DIAGNOSIS — R19.7: ICD-10-CM

## 2021-01-05 LAB
ADD MANUAL DIFF: NO
ALBUMIN SERPL-MCNC: 3.8 G/DL (ref 3.5–5)
ALP SERPL-CCNC: 93 U/L (ref 38–126)
ANION GAP SERPL CALC-SCNC: 6 MMOL/L (ref 5–19)
APPEARANCE UR: (no result)
APTT PPP: YELLOW S
AST SERPL-CCNC: 17 U/L (ref 14–36)
BASOPHILS # BLD AUTO: 0 10^3/UL (ref 0–0.2)
BASOPHILS NFR BLD AUTO: 0.5 % (ref 0–2)
BILIRUB DIRECT SERPL-MCNC: 0.2 MG/DL (ref 0–0.4)
BILIRUB SERPL-MCNC: 0.3 MG/DL (ref 0.2–1.3)
BILIRUB UR QL STRIP: NEGATIVE
BUN SERPL-MCNC: 13 MG/DL (ref 7–20)
CALCIUM: 8.8 MG/DL (ref 8.4–10.2)
CHLORIDE SERPL-SCNC: 102 MMOL/L (ref 98–107)
CO2 SERPL-SCNC: 27 MMOL/L (ref 22–30)
EOSINOPHIL # BLD AUTO: 0 10^3/UL (ref 0–0.6)
EOSINOPHIL NFR BLD AUTO: 0.7 % (ref 0–6)
ERYTHROCYTE [DISTWIDTH] IN BLOOD BY AUTOMATED COUNT: 15 % (ref 11.5–14)
GLUCOSE SERPL-MCNC: 108 MG/DL (ref 75–110)
GLUCOSE UR STRIP-MCNC: NEGATIVE MG/DL
HCT VFR BLD CALC: 38.6 % (ref 36–47)
HGB BLD-MCNC: 13 G/DL (ref 12–15.5)
KETONES UR STRIP-MCNC: 20 MG/DL
LYMPHOCYTES # BLD AUTO: 0.9 10^3/UL (ref 0.5–4.7)
LYMPHOCYTES NFR BLD AUTO: 13.1 % (ref 13–45)
MCH RBC QN AUTO: 27.6 PG (ref 27–33.4)
MCHC RBC AUTO-ENTMCNC: 33.6 G/DL (ref 32–36)
MCV RBC AUTO: 82 FL (ref 80–97)
MONOCYTES # BLD AUTO: 0.7 10^3/UL (ref 0.1–1.4)
MONOCYTES NFR BLD AUTO: 10.4 % (ref 3–13)
NEUTROPHILS # BLD AUTO: 5.1 10^3/UL (ref 1.7–8.2)
NEUTS SEG NFR BLD AUTO: 75.3 % (ref 42–78)
NITRITE UR QL STRIP: NEGATIVE
PH UR STRIP: 6 [PH] (ref 5–9)
PLATELET # BLD: 203 10^3/UL (ref 150–450)
POTASSIUM SERPL-SCNC: 3.4 MMOL/L (ref 3.6–5)
PROT SERPL-MCNC: 6.9 G/DL (ref 6.3–8.2)
PROT UR STRIP-MCNC: 30 MG/DL
RBC # BLD AUTO: 4.7 10^6/UL (ref 3.72–5.28)
SP GR UR STRIP: 1.02
TOTAL CELLS COUNTED % (AUTO): 100 %
UROBILINOGEN UR-MCNC: NEGATIVE MG/DL (ref ?–2)
WBC # BLD AUTO: 6.8 10^3/UL (ref 4–10.5)

## 2021-01-05 PROCEDURE — 81025 URINE PREGNANCY TEST: CPT

## 2021-01-05 PROCEDURE — 36415 COLL VENOUS BLD VENIPUNCTURE: CPT

## 2021-01-05 PROCEDURE — 81001 URINALYSIS AUTO W/SCOPE: CPT

## 2021-01-05 PROCEDURE — 96375 TX/PRO/DX INJ NEW DRUG ADDON: CPT

## 2021-01-05 PROCEDURE — 85025 COMPLETE CBC W/AUTO DIFF WBC: CPT

## 2021-01-05 PROCEDURE — 87088 URINE BACTERIA CULTURE: CPT

## 2021-01-05 PROCEDURE — 80053 COMPREHEN METABOLIC PANEL: CPT

## 2021-01-05 PROCEDURE — 96361 HYDRATE IV INFUSION ADD-ON: CPT

## 2021-01-05 PROCEDURE — 83690 ASSAY OF LIPASE: CPT

## 2021-01-05 PROCEDURE — 96374 THER/PROPH/DIAG INJ IV PUSH: CPT

## 2021-01-05 PROCEDURE — 99284 EMERGENCY DEPT VISIT MOD MDM: CPT

## 2021-01-05 PROCEDURE — 87086 URINE CULTURE/COLONY COUNT: CPT

## 2021-01-05 NOTE — ER DOCUMENT REPORT
ED GI/





- General


Chief Complaint: Nausea/Vomiting/Diarrhea


Stated Complaint: ABDOMINAL PAIN POSSIBLE FOOD POISONING


Time Seen by Provider: 01/05/21 09:19


Primary Care Provider: 


LEIGH SOTOMAYOR MD [Primary Care Provider] - Follow up as needed


TRAVEL OUTSIDE OF THE [place] IN LAST 30 DAYS: No





- HPI


Notes: 





01/05/21 10:17


Patient is a 20-year-old female with no significant past medical history who 

presents with diarrhea and vomiting.  Patient states she ate Chinese food 2 

nights ago.  She ate the food around 11 PM and woke up around 2 AM with vomiting

and diarrhea.  Her boyfriend had similar symptoms but his have already resolved.

 She denies any blood in the stool.  She states she has abdominal cramping in 

her lower abdomen before having diarrhea.  As soon as she has diarrhea, the 

abdominal pain resolves.  She states this has been happening intermittently 

numerous times a day since the symptoms started.  She denies any fevers.  She 

states she did have some chills.  No chest pain or shortness of breath.  No 

cough.  No sore throat.  No recent sick contacts.  No known Covid exposure.  No 

urinary symptoms.  No rashes.





- Related Data


Allergies/Adverse Reactions: 


                                        





No Known Allergies Allergy (Verified 05/08/20 18:08)


   











Past Medical History





- General


Information source: Patient





- Social History


Smoking Status: Never Smoker


Chew tobacco use (# tins/day): No


Frequency of alcohol use: None


Drug Abuse: None


Family History: Reviewed & Not Pertinent


Renal/ Medical History: Denies: Hx Peritoneal Dialysis


Psychiatric Medical History: 


   Denies: Hx Depression





Review of Systems





- Review of Systems


Notes: 





CONSTITUTIONAL:  No fever.  Positive for fatigue.


SKIN:  No rash.  


HENT:  No congestion, ear pain, or sore throat.  


EYES:  No recent vision problems or eye pain.  


CARDIOVASCULAR:  No chest pain or edema.


RESPIRATORY:  No cough, shortness of breath, congestion, or wheezing.  


GASTROINTESTINAL: Positive for abdominal cramping, nausea, vomiting, diarrhea.


GENITOURINARY: No dysuria.  


MUSCULOSKELETAL:  No joint pain or swelling.  


LYMPHATIC: No swollen glands. 


NEUROLOGIC:  No seizures. No headache, focal weakness or sensory changes. 


HEMATOLOGIC:  No unusual bruising or bleeding.  


PSYCHIATRIC:  No depression or anxiety.





Physical Exam





- Vital signs


Vitals: 


                                        











Temp Pulse Resp BP Pulse Ox


 


 98.4 F   101 H  18   117/75   100 


 


 01/05/21 08:54  01/05/21 08:54  01/05/21 08:54  01/05/21 08:54  01/05/21 08:54














- General


General appearance: Appears well


In distress: None


Notes: 





VITAL SIGNS: Within normal limits.


GENERAL:  No acute distress, non-toxic appearance.  


HEAD:  Normal with no signs of head trauma.


EYES:  Conjunctiva normal, no discharge.  


EARS:  Hearing grossly intact.


NOSE: Normal.


NECK:  Normal range of motion, no tenderness, supple, no lymphadenopathy, No 

adenopathy, no JVD.   


CHEST:  Clear breath sounds bilaterally.


CARDIAC:  Regular rate and rhythm.


VASCULAR:  No Edema.


ABDOMEN: Normal and soft with no tenderness, no masses or pulsatile masses.  

Abdomen is soft.  No rigidity.  No guarding.  No right lower quadrant 

tenderness.  No right upper quadrant tenderness.


GENITOURINARY: Normal, No tenderness


MUSCULOSKELETAL:  Good range of motion of all major joints. Extremities without 

clubbing, cyanosis or edema.  


NEUROLOGICAL:  Alert and oriented x 3.  No focal sensory or strength deficits.  

Speech normal.  Follows commands appropriately.


PSYCHIATRIC:  Normal Affect, judgement and mood.


SKIN:  Normal appearance with no rashes or lesions.





Course





- Re-evaluation


Re-evalutation: 





01/05/21 10:22


Patient states her symptoms all started after she ate Chinese food.  I will 

obtain work-up as above.  Patient will be hydrated.  I did discuss Covid testing

with her as she has nausea, vomiting, diarrhea, chills which could be symptoms. 

Patient is in agreement.  She was told that she needs to self isolate until she 

is called with a negative result.  Nursing staff states that she refused Covid 

when they went in the room.  Patient's lab work is unremarkable.  Her urine is 

contaminated.  I will send a urine culture.  She is denying any urinary 

symptoms.  I educated patient on taking probiotics and eating yogurt.  She was 

instructed to drink Gatorade and water and stay hydrated.  Patient was told that

she can have over-the-counter antidiarrheals.  She was given strict return 

precautions.  Patient is very agreeable to the plan. Zofran was sent to the 

pharmacy.


01/05/21 19:12





01/05/21 19:13








- Vital Signs


Vital signs: 


                                        











Temp Pulse Resp BP Pulse Ox


 


 97.7 F   90   18   118/61   99 


 


 01/05/21 13:47  01/05/21 13:47  01/05/21 13:47  01/05/21 13:47  01/05/21 13:47














- Laboratory Results


Result Diagrams: 


                                 01/05/21 10:00





                                 01/05/21 10:00


Laboratory Results Interpreted: 


                                        











  01/05/21 01/05/21 01/05/21





  10:00 10:00 10:00


 


RDW   15.0 H 


 


Sodium  135.2 L  


 


Potassium  3.4 L  


 


Urine Protein    30 H


 


Urine Ketones    20 H


 


Urine Blood    MODERATE H


 


Ur Leukocyte Esterase    LARGE H











Critical Laboratory Results Reviewed: No Critical Results





- Radiology Results


Critical Radiology Results Reviewed: No Critical Results





Discharge





- Discharge


Clinical Impression: 


 Vomiting and diarrhea





Condition: Stable


Disposition: HOME, SELF-CARE


Instructions:  Diarrhea, Nonspecific ([facility]), Vomiting ([facility])


Additional Instructions: 


Your work-up today is reassuring.  Please follow-up with your family doctor.  

You may take your anti-nausea medicine as prescribed.  You may  

antidiarrheals at the pharmacy.  Please return to the ER immediately if symptoms

do not improve in 24 hours.  Also please return if you have any blood in the 

stool, fever, or worsening abdominal pain.  You may  probiotics at the 

pharmacy as well or eat yogurt.  Please eat a bland diet until you feel better. 

Make sure you are staying hydrated.


Prescriptions: 


Ondansetron [Zofran Odt 4 mg Tablet] 4 mg PO Q6H PRN 7 Days #10 tab.rapdis


 PRN Reason: 


Referrals: 


LEIGH SOTOMAYOR MD [Primary Care Provider] - Follow up as needed